# Patient Record
(demographics unavailable — no encounter records)

---

## 2018-01-05 NOTE — RAD
RADIOGRAPH LUMBAR SPINE 4 VIEWS:

 

DATE: 

1-5-18

 

HISTORY: 

59-year-old female with bilateral lumbar radiculopathy. M54.16. 

 

TECHNIQUE:

AP and three lateral views in neutral, flexion, and extension. 

 

COMPARISON:

None. 

 

FINDINGS:

There are five lumbar type vertebrae. Vertebral body heights are maintained. 

 

T12-L1: Mild degenerative disc disease. 

 

L1-2: Small endplate marginal osteophytes. Mild to moderate disc space narrowing. Slight degenerative
 retrolisthesis of L1 on L2. 

 

L2-3: Mild to moderate disc space narrowing. 

 

L3-4: Mild to moderate narrowing of the disc space, mostly posteriorly. 

 

L4-5: Prominent grade I anterolisthesis of L4 on L5 due to degenerative facet disease. Moderate disc 
space narrowing. 

 

L5-S1: Grade I anterolisthesis of L5 on S1 due to degenerative facet disease. Moderate to severe disc
 space narrowing. 

 

There is slight worsening of the spondylolisthesis at L4-5 during flexion compared to extension. Ther
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  

 

IMPRESSION:

1. Lumbar spondylosis consisting of multilevel moderate degenerative disc disease, and lower level hi
gh-grade facet osteoarthrosis. 

2. Grade I spondylolisthesis at L4-5 (minimally unstable) and L5-S1. 

 

 

JEANNETTE 

 

POS: SANJAY

## 2020-01-17 NOTE — MMO
Bilateral MAMMO Bilat Screen [facility].

 

CLINICAL HISTORY:

Patient is 61 years old and is seen for screening.

 

VIEWS:

The views performed were:  bilateral craniocaudal with tomosynthesis and

bilateral mediolateral oblique with tomosynthesis.

 

FILMS COMPARED:

The present examination has been compared to a prior imaging study performed at

[facility] on 01/25/2018.

 

This study has been interpreted with the assistance of computer-aided detection.

 

MAMMOGRAM FINDINGS:

The breasts are almost entirely fat.

 

There are stable benign appearing calcifications seen in both breasts.

 

There are no suspicious masses, suspicious calcifications, or new areas of

architectural distortion.

 

IMPRESSION:

THERE IS NO MAMMOGRAPHIC EVIDENCE OF MALIGNANCY.

 

A ROUTINE FOLLOW-UP MAMMOGRAM IN 1 YEAR IS RECOMMENDED.

 

THE RESULTS OF THIS EXAM WERE SENT TO THE PATIENT.

 

ACR BI-RADS Category 2 - Benign finding

 

MAMMOGRAPHY NOTE:

 1. A negative mammogram report should not delay a biopsy if a dominant of

 clinically suspicious mass is present.

 2. Approximately 10% to 15% of breast cancers are not detected by

 mammography.

 3. Adenosis and dense breasts may obscure an underlying neoplasm.

 

 

Reported by: JULIANA GAMBINO MD

Electonically Signed: [id number]

## 2020-03-05 NOTE — RAD
SEVEN VIEWS OF THE LUMBAR SPINE:



DATE: 3/5/2020.



COMPARISON: 

1/5/2018.



HISTORY: 

Spinal stenosis, chronic back pain.



FINDINGS: 

Frontal imaging demonstrates right upper quadrant clips suggesting prior cholecystectomy. Degenerativ
e change noted involving bilateral hips and sacroiliac joints, right greater than left. Frontal

imaging demonstrates multilevel lateral osteophyte formation throughout the lumbar spine, right great
er than left, most prominent at the L2-3 and L4-5 levels.



The oblique imaging demonstrates no evidence for a pars defect on either side at any level. Neutral l
ateral exam demonstrates anterolisthesis at L4-5 measuring 1 cm and at L5-S1 measuring 9 mm. There

is retrolisthesis at L1-2 measuring 6 mm.



On the flexion imaging the anterolisthesis at L4-5 measures 1.1 cm and at L5-S1 measures 7 mm. The re
trolisthesis at L1-2 measures 6 mm.



On extension imaging the anterolisthesis of L4 on L5 measures 9 mm, the anterolisthesis of L5 on S1 m
easures 9 mm, and the retrolisthesis of L1 on L2 measures 6 mm.



Prominent lower lumbar spine facet hypertrophy noted at L2-3-4, L4-5, and L5-S1. There is disc space 
narrowing with degenerative endplate change and anterior osteophyte formation at T11-12, T12-L1,

and L1-2.



IMPRESSION: 

Multilevel degenerative change within the lumbar spine as described above.



Transcribed Date/Time: 3/5/2020 9:19 AM



Reported By: Bola Moss 

Electronically Signed:  3/5/2020 4:49 PM [facility]

## 2020-03-05 NOTE — MRI
EXAM:

Lumbar spine MRI without contrast.





HISTORY:

Spinal stenosis back pain



COMPARISON:

None



FINDINGS:

Multiplanar, multisequence MRI examination of the lumbar spine is performed.

The conus medullaris region appears unremarkable.

No evidence for abnormal marrow signal.

Extensive multilevel disc osteophytosis and facet arthrosis





T12-L1 disc level: Very mild thinning of the lateral recesses.

L1-L2 disc level: Generalized disc bulging with mild lateral recess stenosis and moderate bilateral f
oraminal stenosis.

L2-L3 disc level: Moderate bilateral recess stenosis and foraminal stenosis

L3-L4 disc level: Diffuse disc bulging with prominent ligament and facet hypertrophic changes with mo
derate to severe lateral recess stenosis, moderate central canal stenosis and moderate bilateral

foraminal stenosis.

L4-L5 disc level: Mild grade 1 anterolisthesis with severe central canal and lateral recess stenosis 
and bilateral foraminal stenosis. Associated annular fissures.

L5-S1 disc level: Mild grade 1 anterolisthesis with numerous right posterior lateral annular fissures
 prominent dorsal lateral ligament and facet hypertrophic changes resulting in some moderate

central canal and lateral recess stenosis and moderate bilateral foraminal stenosis.



IMPRESSION:

Multilevel variable severity canal, lateral recess, or foraminal stenosis as above. Annular fissures 
at L4-L5 and L5-S1. Mild anterolisthesis of L4 and L5 and L5 on S1.



Reported By: Bernabe Mark 

Electronically Signed:  3/5/2020 10:27 AM

## 2020-04-09 NOTE — OP
DATE OF PROCEDURE:  04/09/2020



LOCATION:  OR 12.



ASSISTANT:  Cat Madrigal PA-C



PREPROCEDURE DIAGNOSIS:  Lumbar stenosis with low back and leg pain and now with

falls and leg weakness, acute decline. 



POSTPROCEDURE DIAGNOSIS:  Lumbar stenosis with low back and leg pain and now with

falls and leg weakness, acute decline. 



PROCEDURES PERFORMED:  L3-L4, L4-L5, L5-S1 laminectomies, partial facetectomies,

foraminotomies. 



DESCRIPTION OF PROCEDURE:  After informed consent was obtained from the patient, the

patient was brought to the OR.  Proper patient, pause, and identification were

carried out.  She was placed under excellent general endotracheal anesthesia and

positioned prone on the OR table.  All appropriate points were padded.  We

identified the L3, L4, L5, and S1 dorsal spines.  A linear domo was made over this

region.  This area was sterilely cleansed, prepared, and draped.  Proper patient,

pause, and identification were carried out.  The wound was then opened with

combination of sharp, monopolar, and blunt dissection.  The L3, L4, L5, and S1

dorsal spines and lamina were exposed.  Localization film confirmed our area of

interest.  We then performed L3, L4, L5, and S1 laminectomies, partial

facetectomies, foraminotomies with excellent decompression of common dural tube and

nerve roots.  Copious irrigation occurred throughout as did maximizing hemostasis.

The wound was then closed in anatomic layers following sprinkling of vancomycin

powder.  The patient then emerged from anesthesia. 







Job ID:  [id number]

## 2020-04-10 NOTE — PRG
DATE OF SERVICE:  04/10/2020



SUBJECTIVE:  Ms. Davis is postoperative day 1 for L3-S1 laminectomy, partial

facetectomy, and foraminotomies, done urgently as the patient in addition to her

neurogenic claudication symptoms, was starting to have buckling of her right leg and

increased difficulty with ambulation.  She states this morning that she is up

ambulating.  Her strength is good in her lower extremities and her leg pain has

resolved.  She is not having buckling of her knees.  We went over do's and don'ts in

the postoperative period and also the intraoperative course.  She will be dismissed

today. 







Job ID:  [id number]